# Patient Record
(demographics unavailable — no encounter records)

---

## 2024-10-24 NOTE — HISTORY OF PRESENT ILLNESS
[Patient reported PAP Smear was normal] : Patient reported PAP Smear was normal [N] : Patient does not use contraception [Y] : Positive pregnancy history [Normal Amount/Duration] :  normal amount and duration [Regular Cycle Intervals] : periods have been regular [Frequency: Q ___ days] : menstrual periods occur approximately every [unfilled] days [Menarche Age: ____] : age at menarche was [unfilled] [Menstrual Cramps] : menstrual cramps [Currently Active] : currently active [Men] : men [No] : No [TextBox_4] : w [PapSmeardate] : 2021 [TextBox_31] : AS PER PATIENT [LMPDate] : 8/19/2024 [MensesFreq] : 28-30 [MensesLength] : 4 [PGxTotal] : 1 [facility] : [facility] [FreeTextEntry1] : 8/19/2024

## 2025-07-14 NOTE — HISTORY OF PRESENT ILLNESS
[Delivery Date: ___] : on [unfilled] [Female] : Delivery History: baby girl [Wt. ___] : weighing [unfilled] [Breastfeeding] : currently nursing [BF with Difficulty] : nursing with difficulty [Abdominal Pain] : abdominal pain [Back Pain] : back pain [Breast Pain] : breast pain [BreastFeeding Problems] : breastfeeding problems [Postpartum Follow Up] : postpartum follow up [Complications:___] : no complications [] : delivered by vaginal delivery [Chest Pain] : no chest pain [Cracked Nipples] : no cracked nipples [S/Sx PP Depression] : no signs/symptoms of postpartum depression [Erythema] : not erythematous [Back to Normal] : is back to normal in size [Mild] : mild vaginal bleeding [Normal] : the vagina was normal [Cervix Sample Taken] : cervical sample not taken for a Pap smear [Not Done] : Examination of breasts not done [Doing Well] : is doing well [No Sign of Infection] : is showing no signs of infection [Excellent Pain Control] : has excellent pain control [None] : None [FreeTextEntry8] : breast pain